# Patient Record
Sex: MALE | Race: WHITE | NOT HISPANIC OR LATINO | Employment: FULL TIME | URBAN - METROPOLITAN AREA
[De-identification: names, ages, dates, MRNs, and addresses within clinical notes are randomized per-mention and may not be internally consistent; named-entity substitution may affect disease eponyms.]

---

## 2023-08-10 DIAGNOSIS — I10 ESSENTIAL (PRIMARY) HYPERTENSION: ICD-10-CM

## 2023-08-10 RX ORDER — LISINOPRIL 20 MG/1
20 TABLET ORAL DAILY
Qty: 30 TABLET | Refills: 5 | Status: SHIPPED | OUTPATIENT
Start: 2023-08-10 | End: 2024-02-19

## 2024-02-17 DIAGNOSIS — I10 ESSENTIAL (PRIMARY) HYPERTENSION: ICD-10-CM

## 2024-02-19 RX ORDER — LISINOPRIL 20 MG/1
20 TABLET ORAL DAILY
Qty: 30 TABLET | Refills: 0 | Status: SHIPPED | OUTPATIENT
Start: 2024-02-19 | End: 2024-03-05 | Stop reason: DRUGHIGH

## 2024-03-05 ENCOUNTER — OFFICE VISIT (OUTPATIENT)
Dept: PRIMARY CARE | Facility: CLINIC | Age: 49
End: 2024-03-05
Payer: COMMERCIAL

## 2024-03-05 VITALS
DIASTOLIC BLOOD PRESSURE: 101 MMHG | SYSTOLIC BLOOD PRESSURE: 151 MMHG | HEIGHT: 75 IN | WEIGHT: 228 LBS | TEMPERATURE: 97.4 F | HEART RATE: 100 BPM | BODY MASS INDEX: 28.35 KG/M2

## 2024-03-05 DIAGNOSIS — Z00.00 HEALTHCARE MAINTENANCE: Primary | ICD-10-CM

## 2024-03-05 DIAGNOSIS — I10 HYPERTENSION, UNSPECIFIED TYPE: ICD-10-CM

## 2024-03-05 PROBLEM — K21.9 GASTROESOPHAGEAL REFLUX DISEASE WITHOUT ESOPHAGITIS: Status: ACTIVE | Noted: 2024-03-05

## 2024-03-05 PROBLEM — R73.03 PREDIABETES: Status: ACTIVE | Noted: 2024-03-05

## 2024-03-05 PROBLEM — E78.5 HYPERLIPIDEMIA: Status: ACTIVE | Noted: 2024-03-05

## 2024-03-05 PROCEDURE — 3080F DIAST BP >= 90 MM HG: CPT | Performed by: FAMILY MEDICINE

## 2024-03-05 PROCEDURE — 3077F SYST BP >= 140 MM HG: CPT | Performed by: FAMILY MEDICINE

## 2024-03-05 PROCEDURE — 1036F TOBACCO NON-USER: CPT | Performed by: FAMILY MEDICINE

## 2024-03-05 PROCEDURE — 99396 PREV VISIT EST AGE 40-64: CPT | Performed by: FAMILY MEDICINE

## 2024-03-05 RX ORDER — PANTOPRAZOLE SODIUM 40 MG/1
40 TABLET, DELAYED RELEASE ORAL
COMMUNITY
Start: 2023-10-14

## 2024-03-05 RX ORDER — LISINOPRIL 30 MG/1
30 TABLET ORAL DAILY
Qty: 30 TABLET | Refills: 3 | Status: SHIPPED | OUTPATIENT
Start: 2024-03-05

## 2024-03-05 ASSESSMENT — PATIENT HEALTH QUESTIONNAIRE - PHQ9
1. LITTLE INTEREST OR PLEASURE IN DOING THINGS: NOT AT ALL
SUM OF ALL RESPONSES TO PHQ9 QUESTIONS 1 AND 2: 0
2. FEELING DOWN, DEPRESSED OR HOPELESS: NOT AT ALL

## 2024-03-05 NOTE — PROGRESS NOTES
"Subjective   Patient ID: Ricardo Nunez is a 48 y.o. male who presents for Annual Exam (He is not fasting for blood work today. ).    HPI   48 year-old male presents for wellness physical.      hx HTN- on lisinopril 20mg; doesn't check BP at home.   Has been under a lot of stress recently at work.     hx HLD- not on any meds;      hx prediabetes HbA1c 5.8 in 2022    BMI 28  no reg exercise but stays active.   trying to eat a healthy diet  last lipids 3/2022 - didn't do labs last yr;      Wears glasses only prn; sees optho  sees dentist     he denies any chest pains, palpitations, edema, shortness of breath, abdominal pains, nausea, vomiting, diarrhea, constipation, blood in stool, melena. no groin or testicle pain or swelling. no urinary changes.     Denies any mole changes.  no hx of skin CA     adacel 2021  flu shot- had  covid shots- had      11/2021 Colonoscopy [facility]- fu 7-10 yrs  hx GERD- on pantoprazole. Takes occ tums;   11/2021 EGD    hx of smoking- smoked 1ppd from 17yo to 42yo; quit using accupuncture;   drinks 6-8 light beers about 5 nights/wk       Review of Systems  ROS as stated in HPI    Objective   BP (!) 151/101   Pulse 100   Temp 36.3 °C (97.4 °F)   Ht 1.892 m (6' 2.5\")   Wt 103 kg (228 lb)   BMI 28.88 kg/m²     Physical Exam  Constitutional: A&O; NAD  HEENT: conjunctiva normal. EOMI; PERRLA; lids normal; TM's clear;   Neck: supple; No lymphadenopathy   Heart: RRR     Lungs: CTA bilateral   Abd: Soft, Nontender, Nondistended  Ext:  No edema;    Neuro: No gross neuro deficits     Psych: Judgment, orientation, mood, affect, insight wnl   Assessment/Plan   Problem List Items Addressed This Visit             ICD-10-CM    HTN (hypertension) I10    Relevant Medications    lisinopril (ZestriL) 30 mg tablet     Other Visit Diagnoses         Codes    Healthcare maintenance    -  Primary Z00.00    Relevant Orders    CBC    Comprehensive Metabolic Panel    Hemoglobin A1C    Lipid Panel    TSH with " reflex to Free T4 if abnormal    Vitamin D 25-Hydroxy,Total (for eval of Vitamin D levels)          11/ 2021 Colonoscopy [facility]- fu 7-10 yrs  adacel 2021  flu shot- had  covid shots- had   healthy lifestyle-diet, exercise  Cut back on beer   check fasting labs  Increase lisinopril from 20mg to 30mg  monitor BP/HR at home and fu in a few mo to recheck  CT calcium score info given- defers at this time

## 2024-03-18 ENCOUNTER — LAB (OUTPATIENT)
Dept: LAB | Facility: LAB | Age: 49
End: 2024-03-18
Payer: COMMERCIAL

## 2024-03-18 DIAGNOSIS — Z00.00 HEALTHCARE MAINTENANCE: ICD-10-CM

## 2024-03-18 LAB
25(OH)D3 SERPL-MCNC: 32 NG/ML (ref 30–100)
ALBUMIN SERPL BCP-MCNC: 4.6 G/DL (ref 3.4–5)
ALP SERPL-CCNC: 41 U/L (ref 33–120)
ALT SERPL W P-5'-P-CCNC: 33 U/L (ref 10–52)
ANION GAP SERPL CALC-SCNC: 18 MMOL/L (ref 10–20)
AST SERPL W P-5'-P-CCNC: 21 U/L (ref 9–39)
BILIRUB SERPL-MCNC: 0.9 MG/DL (ref 0–1.2)
BUN SERPL-MCNC: 13 MG/DL (ref 6–23)
CALCIUM SERPL-MCNC: 9.4 MG/DL (ref 8.6–10.6)
CHLORIDE SERPL-SCNC: 100 MMOL/L (ref 98–107)
CHOLEST SERPL-MCNC: 220 MG/DL (ref 0–199)
CHOLESTEROL/HDL RATIO: 4.3
CO2 SERPL-SCNC: 21 MMOL/L (ref 21–32)
CREAT SERPL-MCNC: 1.08 MG/DL (ref 0.5–1.3)
EGFRCR SERPLBLD CKD-EPI 2021: 85 ML/MIN/1.73M*2
ERYTHROCYTE [DISTWIDTH] IN BLOOD BY AUTOMATED COUNT: 12.3 % (ref 11.5–14.5)
EST. AVERAGE GLUCOSE BLD GHB EST-MCNC: 117 MG/DL
GLUCOSE SERPL-MCNC: 132 MG/DL (ref 74–99)
HBA1C MFR BLD: 5.7 %
HCT VFR BLD AUTO: 41.6 % (ref 41–52)
HDLC SERPL-MCNC: 50.8 MG/DL
HGB BLD-MCNC: 14.1 G/DL (ref 13.5–17.5)
LDLC SERPL CALC-MCNC: ABNORMAL MG/DL
MCH RBC QN AUTO: 30.3 PG (ref 26–34)
MCHC RBC AUTO-ENTMCNC: 33.9 G/DL (ref 32–36)
MCV RBC AUTO: 90 FL (ref 80–100)
NON HDL CHOLESTEROL: 169 MG/DL (ref 0–149)
NRBC BLD-RTO: 0 /100 WBCS (ref 0–0)
PLATELET # BLD AUTO: 261 X10*3/UL (ref 150–450)
POTASSIUM SERPL-SCNC: 4.2 MMOL/L (ref 3.5–5.3)
PROT SERPL-MCNC: 7.2 G/DL (ref 6.4–8.2)
RBC # BLD AUTO: 4.65 X10*6/UL (ref 4.5–5.9)
SODIUM SERPL-SCNC: 135 MMOL/L (ref 136–145)
TRIGL SERPL-MCNC: 463 MG/DL (ref 0–149)
TSH SERPL-ACNC: 1.54 MIU/L (ref 0.44–3.98)
VLDL: ABNORMAL
WBC # BLD AUTO: 6.4 X10*3/UL (ref 4.4–11.3)

## 2024-03-18 PROCEDURE — 80061 LIPID PANEL: CPT

## 2024-03-18 PROCEDURE — 85027 COMPLETE CBC AUTOMATED: CPT

## 2024-03-18 PROCEDURE — 36415 COLL VENOUS BLD VENIPUNCTURE: CPT

## 2024-03-18 PROCEDURE — 83036 HEMOGLOBIN GLYCOSYLATED A1C: CPT

## 2024-03-18 PROCEDURE — 82306 VITAMIN D 25 HYDROXY: CPT

## 2024-03-18 PROCEDURE — 84443 ASSAY THYROID STIM HORMONE: CPT

## 2024-03-18 PROCEDURE — 80053 COMPREHEN METABOLIC PANEL: CPT

## 2024-03-21 ENCOUNTER — TELEPHONE (OUTPATIENT)
Dept: PRIMARY CARE | Facility: CLINIC | Age: 49
End: 2024-03-21
Payer: COMMERCIAL

## 2024-03-21 DIAGNOSIS — E78.5 HYPERLIPIDEMIA, UNSPECIFIED HYPERLIPIDEMIA TYPE: Primary | ICD-10-CM

## 2024-03-21 RX ORDER — ROSUVASTATIN CALCIUM 10 MG/1
10 TABLET, COATED ORAL DAILY
Qty: 30 TABLET | Refills: 3 | Status: SHIPPED | OUTPATIENT
Start: 2024-03-21 | End: 2024-06-11 | Stop reason: ALTCHOICE

## 2024-03-21 NOTE — TELEPHONE ENCOUNTER
----- Message from Ida Brown DO sent at 3/20/2024 10:06 AM EDT -----  Please let pt know that his vitamin D, blood counts, thyroid, liver, and kidney function are all normal.   His sodium was slightly low at 135(should be above 136)  His sugar was elevated at 132 and his HbA1c, the 3 month average of their sugars was 5.7, which is in the pre-diabetic range (anything 6.5 and above is considered diabetic).   His cholesterol was again elevated and his triglycerides were quite high at 463 (should be less than 150) .   I recommend a healthy diet and exercise and see if he is open to trying a cholesterol medication at this poing.

## 2024-06-07 NOTE — PROGRESS NOTES
"Subjective   Patient ID: Ricardo Nunez is a 48 y.o. male who presents for Hypertension (Did not start crestor as he is afraid of the side effects. ).    HPI   48 year-old male presents for fu    Was seen for physical 3/5/24     hx HTN- on lisinopril 30mg (inc from 20mg in march); doesn't check BP at home.   Had been under a lot of stress at work- is getting better    hx HLD/elevated TG- rxd crestor after labs in 3/2024  TG were 463 but hasn't started in fear of potential side effects. Prior TG in the 200s.      hx prediabetes     BMI 28  no reg exercise but stays active. Considering joining [employer]  trying to eat a healthy diet       he denies any chest pains, palpitations, edema, shortness of breath, abdominal pains, nausea, vomiting, diarrhea, constipation, blood in stool, melena. no groin or testicle pain or swelling. no urinary changes.      11/2021 Colonoscopy [facility]- fu 7-10 yrs  hx GERD- on pantoprazole. 11/2021 EGD    hx of smoking- smoked 1ppd from 17yo to 44yo; quit using accupuncture;   Drinks beers about 5 nights/wk trying to cut down- now about 4 beers/night      Review of Systems  ROS as stated in HPI     Objective   /81   Pulse 82   Temp 36.7 °C (98.1 °F)   Ht 1.892 m (6' 2.5\")   Wt 103 kg (226 lb)   BMI 28.63 kg/m²     Physical Exam  Constitutional: A&O; NAD  HEENT: conjunctiva normal. EOMI; PERRLA; lids normal; TM's clear;   Neck: supple; No lymphadenopathy   Heart: RRR     Lungs: CTA bilateral   Abd: Soft, Nontender, Nondistended  Ext:  No edema;    Neuro: No gross neuro deficits     Psych: Judgment, orientation, mood, affect, insight wnl   Assessment/Plan   Problem List Items Addressed This Visit             ICD-10-CM    HTN (hypertension) - Primary I10    Relevant Orders    Comprehensive Metabolic Panel    CT cardiac scoring wo IV contrast    Hyperlipidemia E78.5    Relevant Orders    Comprehensive Metabolic Panel    Lipid Panel    CT cardiac scoring wo IV contrast    " Prediabetes R73.03    Relevant Orders    Comprehensive Metabolic Panel    Hemoglobin A1C    CT cardiac scoring wo IV contrast        11/ 2021 Colonoscopy [facility]- fu 7-10 yrs  adacel 2021  flu shot- had  covid shots- had   healthy lifestyle-diet, exercise  Cut back on beer   recheck fasting labs after 6/18  Check CT calcium score info given  Will reconsider cholesterol meds after repeat labs/CT calcium  cont lisinopril  30mg  monitor BP/HR at home

## 2024-06-11 ENCOUNTER — OFFICE VISIT (OUTPATIENT)
Dept: PRIMARY CARE | Facility: CLINIC | Age: 49
End: 2024-06-11
Payer: COMMERCIAL

## 2024-06-11 VITALS
DIASTOLIC BLOOD PRESSURE: 81 MMHG | BODY MASS INDEX: 28.1 KG/M2 | TEMPERATURE: 98.1 F | HEART RATE: 82 BPM | WEIGHT: 226 LBS | SYSTOLIC BLOOD PRESSURE: 125 MMHG | HEIGHT: 75 IN

## 2024-06-11 DIAGNOSIS — E78.5 HYPERLIPIDEMIA, UNSPECIFIED HYPERLIPIDEMIA TYPE: ICD-10-CM

## 2024-06-11 DIAGNOSIS — I10 HYPERTENSION, UNSPECIFIED TYPE: Primary | ICD-10-CM

## 2024-06-11 DIAGNOSIS — R73.03 PREDIABETES: ICD-10-CM

## 2024-06-11 PROCEDURE — 99214 OFFICE O/P EST MOD 30 MIN: CPT | Performed by: FAMILY MEDICINE

## 2024-06-11 PROCEDURE — 1036F TOBACCO NON-USER: CPT | Performed by: FAMILY MEDICINE

## 2024-06-11 PROCEDURE — 3079F DIAST BP 80-89 MM HG: CPT | Performed by: FAMILY MEDICINE

## 2024-06-11 PROCEDURE — 3074F SYST BP LT 130 MM HG: CPT | Performed by: FAMILY MEDICINE

## 2024-06-29 DIAGNOSIS — I10 HYPERTENSION, UNSPECIFIED TYPE: ICD-10-CM

## 2024-07-01 RX ORDER — LISINOPRIL 30 MG/1
30 TABLET ORAL DAILY
Qty: 30 TABLET | Refills: 3 | Status: SHIPPED | OUTPATIENT
Start: 2024-07-01

## 2024-08-14 ENCOUNTER — APPOINTMENT (OUTPATIENT)
Dept: RADIOLOGY | Facility: CLINIC | Age: 49
End: 2024-08-14
Payer: COMMERCIAL

## 2024-09-17 ENCOUNTER — HOSPITAL ENCOUNTER (OUTPATIENT)
Dept: RADIOLOGY | Facility: CLINIC | Age: 49
Discharge: HOME | End: 2024-09-17
Payer: COMMERCIAL

## 2024-09-17 ENCOUNTER — TELEPHONE (OUTPATIENT)
Dept: PRIMARY CARE | Facility: CLINIC | Age: 49
End: 2024-09-17
Payer: COMMERCIAL

## 2024-09-17 DIAGNOSIS — E78.5 HYPERLIPIDEMIA, UNSPECIFIED HYPERLIPIDEMIA TYPE: ICD-10-CM

## 2024-09-17 DIAGNOSIS — R93.1 ELEVATED CORONARY ARTERY CALCIUM SCORE: ICD-10-CM

## 2024-09-17 DIAGNOSIS — I10 HYPERTENSION, UNSPECIFIED TYPE: ICD-10-CM

## 2024-09-17 DIAGNOSIS — R73.03 PREDIABETES: ICD-10-CM

## 2024-09-17 PROCEDURE — 75571 CT HRT W/O DYE W/CA TEST: CPT

## 2024-09-17 NOTE — TELEPHONE ENCOUNTER
----- Message from Ida Kevin sent at 9/17/2024  8:59 AM EDT -----  Please let patient know his CT calcium score was quite elevated at 689 which indicates a moderate cardiac risk.  I recommend he recheck his labs as ordered and reconsider a cholesterol medication.  I would also like him to follow-up with a cardiologist for further evaluation.  Enter referral for cardio dx elevated coronary calcium score

## 2024-09-24 ENCOUNTER — LAB (OUTPATIENT)
Dept: LAB | Facility: LAB | Age: 49
End: 2024-09-24
Payer: COMMERCIAL

## 2024-09-24 DIAGNOSIS — I10 HYPERTENSION, UNSPECIFIED TYPE: ICD-10-CM

## 2024-09-24 DIAGNOSIS — R73.03 PREDIABETES: ICD-10-CM

## 2024-09-24 DIAGNOSIS — E78.5 HYPERLIPIDEMIA, UNSPECIFIED HYPERLIPIDEMIA TYPE: ICD-10-CM

## 2024-09-24 LAB
ALBUMIN SERPL BCP-MCNC: 4.5 G/DL (ref 3.4–5)
ALP SERPL-CCNC: 50 U/L (ref 33–120)
ALT SERPL W P-5'-P-CCNC: 34 U/L (ref 10–52)
ANION GAP SERPL CALC-SCNC: 15 MMOL/L (ref 10–20)
AST SERPL W P-5'-P-CCNC: 24 U/L (ref 9–39)
BILIRUB SERPL-MCNC: 0.6 MG/DL (ref 0–1.2)
BUN SERPL-MCNC: 16 MG/DL (ref 6–23)
CALCIUM SERPL-MCNC: 9.7 MG/DL (ref 8.6–10.6)
CHLORIDE SERPL-SCNC: 101 MMOL/L (ref 98–107)
CHOLEST SERPL-MCNC: 180 MG/DL (ref 0–199)
CHOLESTEROL/HDL RATIO: 3.2
CO2 SERPL-SCNC: 25 MMOL/L (ref 21–32)
CREAT SERPL-MCNC: 1.18 MG/DL (ref 0.5–1.3)
EGFRCR SERPLBLD CKD-EPI 2021: 76 ML/MIN/1.73M*2
EST. AVERAGE GLUCOSE BLD GHB EST-MCNC: 111 MG/DL
GLUCOSE SERPL-MCNC: 125 MG/DL (ref 74–99)
HBA1C MFR BLD: 5.5 %
HDLC SERPL-MCNC: 56.8 MG/DL
LDLC SERPL CALC-MCNC: 69 MG/DL
NON HDL CHOLESTEROL: 123 MG/DL (ref 0–149)
POTASSIUM SERPL-SCNC: 4.6 MMOL/L (ref 3.5–5.3)
PROT SERPL-MCNC: 7 G/DL (ref 6.4–8.2)
SODIUM SERPL-SCNC: 136 MMOL/L (ref 136–145)
TRIGL SERPL-MCNC: 273 MG/DL (ref 0–149)
VLDL: 55 MG/DL (ref 0–40)

## 2024-09-24 PROCEDURE — 36415 COLL VENOUS BLD VENIPUNCTURE: CPT

## 2024-09-24 PROCEDURE — 80053 COMPREHEN METABOLIC PANEL: CPT

## 2024-09-24 PROCEDURE — 80061 LIPID PANEL: CPT

## 2024-09-24 PROCEDURE — 83036 HEMOGLOBIN GLYCOSYLATED A1C: CPT

## 2024-09-25 ENCOUNTER — TELEPHONE (OUTPATIENT)
Dept: PRIMARY CARE | Facility: CLINIC | Age: 49
End: 2024-09-25
Payer: COMMERCIAL

## 2024-09-25 DIAGNOSIS — R73.03 PREDIABETES: ICD-10-CM

## 2024-09-25 DIAGNOSIS — E78.5 HYPERLIPIDEMIA, UNSPECIFIED HYPERLIPIDEMIA TYPE: ICD-10-CM

## 2024-09-25 DIAGNOSIS — I10 HYPERTENSION, UNSPECIFIED TYPE: ICD-10-CM

## 2024-09-25 RX ORDER — ROSUVASTATIN CALCIUM 5 MG/1
5 TABLET, COATED ORAL DAILY
Qty: 30 TABLET | Refills: 3 | Status: SHIPPED | OUTPATIENT
Start: 2024-09-25 | End: 2025-01-23

## 2024-09-25 NOTE — TELEPHONE ENCOUNTER
----- Message from Ida Brown sent at 9/25/2024 11:07 AM EDT -----  Please let pt know that his electrolytes, liver, and kidney function are all normal.   His triglycerides were better but still quite elevated at 275.  Should be less than 150. I recommend a healthy diet and exercise and see if he is open to trying a cholesterol medication at this point.  His sugar was elevated at 125 and his HbA1c, the 3 month average of sugars was 5.5 (anything 6.5 and above is considered diabetic).  I recommend a healthy diet and exercise and we will continue to monitor.

## 2024-10-01 ENCOUNTER — OFFICE VISIT (OUTPATIENT)
Dept: CARDIOLOGY | Facility: CLINIC | Age: 49
End: 2024-10-01
Payer: COMMERCIAL

## 2024-10-01 VITALS
SYSTOLIC BLOOD PRESSURE: 140 MMHG | BODY MASS INDEX: 27.54 KG/M2 | DIASTOLIC BLOOD PRESSURE: 94 MMHG | HEIGHT: 76 IN | OXYGEN SATURATION: 97 % | HEART RATE: 83 BPM | WEIGHT: 226.2 LBS

## 2024-10-01 DIAGNOSIS — I49.3 PVC (PREMATURE VENTRICULAR CONTRACTION): ICD-10-CM

## 2024-10-01 DIAGNOSIS — I10 HYPERTENSION, UNSPECIFIED TYPE: ICD-10-CM

## 2024-10-01 DIAGNOSIS — R93.1 ELEVATED CORONARY ARTERY CALCIUM SCORE: Primary | ICD-10-CM

## 2024-10-01 DIAGNOSIS — R06.02 SHORTNESS OF BREATH: ICD-10-CM

## 2024-10-01 DIAGNOSIS — E78.5 HYPERLIPIDEMIA, UNSPECIFIED HYPERLIPIDEMIA TYPE: ICD-10-CM

## 2024-10-01 PROCEDURE — 3077F SYST BP >= 140 MM HG: CPT | Performed by: STUDENT IN AN ORGANIZED HEALTH CARE EDUCATION/TRAINING PROGRAM

## 2024-10-01 PROCEDURE — 99204 OFFICE O/P NEW MOD 45 MIN: CPT | Performed by: STUDENT IN AN ORGANIZED HEALTH CARE EDUCATION/TRAINING PROGRAM

## 2024-10-01 PROCEDURE — 3080F DIAST BP >= 90 MM HG: CPT | Performed by: STUDENT IN AN ORGANIZED HEALTH CARE EDUCATION/TRAINING PROGRAM

## 2024-10-01 PROCEDURE — 3008F BODY MASS INDEX DOCD: CPT | Performed by: STUDENT IN AN ORGANIZED HEALTH CARE EDUCATION/TRAINING PROGRAM

## 2024-10-01 PROCEDURE — 93005 ELECTROCARDIOGRAM TRACING: CPT | Performed by: STUDENT IN AN ORGANIZED HEALTH CARE EDUCATION/TRAINING PROGRAM

## 2024-10-01 PROCEDURE — 99214 OFFICE O/P EST MOD 30 MIN: CPT | Performed by: STUDENT IN AN ORGANIZED HEALTH CARE EDUCATION/TRAINING PROGRAM

## 2024-10-01 PROCEDURE — 93010 ELECTROCARDIOGRAM REPORT: CPT | Performed by: STUDENT IN AN ORGANIZED HEALTH CARE EDUCATION/TRAINING PROGRAM

## 2024-10-01 RX ORDER — LISINOPRIL 40 MG/1
40 TABLET ORAL DAILY
Qty: 30 TABLET | Refills: 11 | Status: SHIPPED | OUTPATIENT
Start: 2024-10-01 | End: 2025-10-01

## 2024-10-01 NOTE — PROGRESS NOTES
Referred by Dr. Brown for Hyperlipidemia and Hypertension (Referred by Dr Brown for cardiac consult due to elevated calcium score)     History Of Present Illness:    Ricardo Nunez is a 48 y.o. male past with history notable for hypertriglyceridemia and hypertension referred to our clinic due to elevated calcium score.  Patient recently underwent calcium score that came back at 689.  At baseline he denies angina symptoms.  He does have some shortness of breath with heavy exertion.  No lightheadedness dizziness or syncopal events.  No orthopnea PND or lower extreme edema.  No family history of elevated cardiovascular events.  He is a former smoker but quit 5 to 10 years ago.  He does drink up to 6 beers of alcohol per day.  His most recent cholesterol reviewed showing acceptable LDL and HDL numbers triglycerides are elevated likely in setting of alcohol consumption.  Most recent A1c is 5.5.  Baseline ECG shows sinus rhythm with PVCs.    Past Medical History:  He has no past medical history on file.    Past Surgical History:  He has a past surgical history that includes Other surgical history (09/19/2019).      Social History:  He reports that he quit smoking about 7 years ago. His smoking use included cigarettes. He has never used smokeless tobacco. He reports current alcohol use. He reports that he does not use drugs.    Family History:  No family history on file.     Allergies:  Patient has no known allergies.    Outpatient Medications:  Current Outpatient Medications   Medication Instructions    lisinopril 40 mg, oral, Daily    multivitamin with minerals (multivitamin-iron-folic acid) tablet 1 tablet, oral, Daily    pantoprazole (PROTONIX) 40 mg, oral, Daily before breakfast    rosuvastatin (CRESTOR) 5 mg, oral, Daily        Last Recorded Vitals:  Vitals:    10/01/24 1058   BP: (!) 140/94   BP Location: Left arm   Patient Position: Sitting   BP Cuff Size: Adult   Pulse: 83   SpO2: 97%   Weight: 103 kg (226 lb 3.2  "oz)   Height: 1.93 m (6' 4\")       Physical Exam:  General: No acute distress,  A&O x3  Skin: Warm and dry  Neck: JVD is not elevated  ENT: Moist mucous membranes no lesions appreciated  Pulmonary: CTAB  Cards: Regular rate rhythm, no murmurs gallops or rubs appreciated normal S1-S2  Abdomen: Soft nontender nondistended  Extremities: No edema or cyanosis  Psych: Appropriate mood and affect          Last Labs:  CBC -  Lab Results   Component Value Date    WBC 6.4 03/18/2024    HGB 14.1 03/18/2024    HCT 41.6 03/18/2024    MCV 90 03/18/2024     03/18/2024       CMP -  Lab Results   Component Value Date    CALCIUM 9.7 09/24/2024    PROT 7.0 09/24/2024    ALBUMIN 4.5 09/24/2024    AST 24 09/24/2024    ALT 34 09/24/2024    ALKPHOS 50 09/24/2024    BILITOT 0.6 09/24/2024       LIPID PANEL -   Lab Results   Component Value Date    CHOL 180 09/24/2024    TRIG 273 (H) 09/24/2024    HDL 56.8 09/24/2024    CHHDL 3.2 09/24/2024    LDLF 123 (H) 03/29/2022    VLDL 55 (H) 09/24/2024    NHDL 123 09/24/2024       RENAL FUNCTION PANEL -   Lab Results   Component Value Date    GLUCOSE 125 (H) 09/24/2024     09/24/2024    K 4.6 09/24/2024     09/24/2024    CO2 25 09/24/2024    ANIONGAP 15 09/24/2024    BUN 16 09/24/2024    CREATININE 1.18 09/24/2024    GFRMALE 85 03/29/2022    CALCIUM 9.7 09/24/2024    ALBUMIN 4.5 09/24/2024        Lab Results   Component Value Date    HGBA1C 5.5 09/24/2024         Assessment/Plan     1.  CAD: Based on calcium score 689.  No angina symptoms mild shortness of breath with exertion.  Baseline ECG of sinus rhythm with PVCs.  Risk factors include hypertension hypertriglyceridemia as well as excessive alcohol consumption.  He is on rosuvastatin for risk modification.  Most recent LDL is less than 70.    -Ordered stress echocardiogram for further risk stratification    2.  Hypertension: Blood pressures are not at goal.  Increase lisinopril to 40 mg daily.  Patient will monitor blood " pressure daily for the next 2 weeks and call us with results.    3.  Hypertriglyceridemia: He is on rosuvastatin 5 mg daily.  LDL is at goal.  Suspect triglycerides elevated in setting of excessive alcohol consumption.  Discussed the need to minimize alcohol usage.    4.  Alcohol consumption: Drinks about 6 light beers daily.  Advised patient to cut that in half or lower.    6-month follow-up if testing is unremarkable    (This note was generated with voice recognition software and may contain errors including spelling, grammar, syntax and missed recognition of what was dictated, of which may not have been fully corrected)       Tapan Sheppard MD PhD

## 2024-10-01 NOTE — LETTER
October 1, 2024     Ida Brown DO  [address]  [address]  [facility] [id number]    Patient: Ricardo Nunez   YOB: 1975   Date of Visit: 10/1/2024       Dear Dr. Ida Brown, :    Thank you for referring Ricardo Nunez to me for evaluation. Below are the relevant portions of my assessment and plan of care.    Assessment / Plan:  1.  CAD: Based on calcium score 689.  No angina symptoms mild shortness of breath with exertion.  Baseline ECG of sinus rhythm with PVCs.  Risk factors include hypertension hypertriglyceridemia as well as excessive alcohol consumption.  He is on rosuvastatin for risk modification.  Most recent LDL is less than 70.    -Ordered stress echocardiogram for further risk stratification    2.  Hypertension: Blood pressures are not at goal.  Increase lisinopril to 40 mg daily.  Patient will monitor blood pressure daily for the next 2 weeks and call us with results.    3.  Hypertriglyceridemia: He is on rosuvastatin 5 mg daily.  LDL is at goal.  Suspect triglycerides elevated in setting of excessive alcohol consumption.  Discussed the need to minimize alcohol usage.    4.  Alcohol consumption: Drinks about 6 light beers daily.  Advised patient to cut that in half or lower.    6-month follow-up if testing is unremarkable    (This note was generated with voice recognition software and may contain errors including spelling, grammar, syntax and missed recognition of what was dictated, of which may not have been fully corrected)       Tapan Sheppard MD PhD    If you have questions, please do not hesitate to call me. I look forward to following Ricardo along with you.         Sincerely,        Tapan Sheppard MD PhD        CC: No Recipients

## 2024-10-15 ENCOUNTER — HOSPITAL ENCOUNTER (OUTPATIENT)
Dept: CARDIOLOGY | Facility: CLINIC | Age: 49
Discharge: HOME | End: 2024-10-15
Payer: COMMERCIAL

## 2024-10-15 DIAGNOSIS — I10 HYPERTENSION, UNSPECIFIED TYPE: ICD-10-CM

## 2024-10-15 DIAGNOSIS — R93.1 ELEVATED CORONARY ARTERY CALCIUM SCORE: ICD-10-CM

## 2024-10-15 DIAGNOSIS — E78.5 HYPERLIPIDEMIA, UNSPECIFIED HYPERLIPIDEMIA TYPE: ICD-10-CM

## 2024-10-15 DIAGNOSIS — R06.02 SHORTNESS OF BREATH: ICD-10-CM

## 2024-10-15 DIAGNOSIS — I49.3 PVC (PREMATURE VENTRICULAR CONTRACTION): ICD-10-CM

## 2024-10-15 PROCEDURE — 93017 CV STRESS TEST TRACING ONLY: CPT

## 2025-02-02 DIAGNOSIS — E78.5 HYPERLIPIDEMIA, UNSPECIFIED HYPERLIPIDEMIA TYPE: ICD-10-CM

## 2025-02-04 RX ORDER — ROSUVASTATIN CALCIUM 5 MG/1
5 TABLET, COATED ORAL DAILY
Qty: 30 TABLET | Refills: 3 | Status: SHIPPED | OUTPATIENT
Start: 2025-02-04

## 2025-05-28 NOTE — TELEPHONE ENCOUNTER
Informed patient.  He would like to try a cholesterol medication at this point.  He has a follow up in June already scheduled.    X2 attempt for lab and line. Unable to obtain at this time.

## 2025-06-07 DIAGNOSIS — E78.5 HYPERLIPIDEMIA, UNSPECIFIED HYPERLIPIDEMIA TYPE: ICD-10-CM

## 2025-06-10 RX ORDER — ROSUVASTATIN CALCIUM 5 MG/1
5 TABLET, COATED ORAL DAILY
Qty: 30 TABLET | Refills: 3 | Status: SHIPPED | OUTPATIENT
Start: 2025-06-10

## 2025-08-02 LAB
ALBUMIN SERPL-MCNC: 4.6 G/DL (ref 3.6–5.1)
ALP SERPL-CCNC: 54 U/L (ref 36–130)
ALT SERPL-CCNC: 32 U/L (ref 9–46)
ANION GAP SERPL CALCULATED.4IONS-SCNC: 10 MMOL/L (CALC) (ref 7–17)
AST SERPL-CCNC: 19 U/L (ref 10–40)
BILIRUB SERPL-MCNC: 0.6 MG/DL (ref 0.2–1.2)
BUN SERPL-MCNC: 23 MG/DL (ref 7–25)
CALCIUM SERPL-MCNC: 9.7 MG/DL (ref 8.6–10.3)
CHLORIDE SERPL-SCNC: 104 MMOL/L (ref 98–110)
CHOLEST SERPL-MCNC: 126 MG/DL
CHOLEST/HDLC SERPL: 2.7 (CALC)
CO2 SERPL-SCNC: 24 MMOL/L (ref 20–32)
CREAT SERPL-MCNC: 1.16 MG/DL (ref 0.6–1.29)
EGFRCR SERPLBLD CKD-EPI 2021: 77 ML/MIN/1.73M2
EST. AVERAGE GLUCOSE BLD GHB EST-MCNC: 126 MG/DL
EST. AVERAGE GLUCOSE BLD GHB EST-SCNC: 7 MMOL/L
GLUCOSE SERPL-MCNC: 123 MG/DL (ref 65–99)
HBA1C MFR BLD: 6 %
HDLC SERPL-MCNC: 46 MG/DL
LDLC SERPL CALC-MCNC: 63 MG/DL (CALC)
NONHDLC SERPL-MCNC: 80 MG/DL (CALC)
POTASSIUM SERPL-SCNC: 4.4 MMOL/L (ref 3.5–5.3)
PROT SERPL-MCNC: 7.2 G/DL (ref 6.1–8.1)
SODIUM SERPL-SCNC: 138 MMOL/L (ref 135–146)
TRIGL SERPL-MCNC: 88 MG/DL

## 2025-08-04 ASSESSMENT — PROMIS GLOBAL HEALTH SCALE
CARRYOUT_PHYSICAL_ACTIVITIES: COMPLETELY
RATE_SOCIAL_SATISFACTION: GOOD
RATE_AVERAGE_FATIGUE: MILD
CARRYOUT_SOCIAL_ACTIVITIES: GOOD
RATE_AVERAGE_PAIN: 2
RATE_QUALITY_OF_LIFE: GOOD
RATE_MENTAL_HEALTH: GOOD
RATE_PHYSICAL_HEALTH: GOOD
EMOTIONAL_PROBLEMS: RARELY
RATE_GENERAL_HEALTH: GOOD

## 2025-08-06 NOTE — PROGRESS NOTES
"Subjective   Patient ID: Ricardo Nunez is a 49 y.o. male who presents for Annual Exam (Blood work done. ).    HPI     49 year-old male presents for phyiscal    recent labs:   hba1c 6 glucose 123   rest of cmp and lipids were ok    9/2024 CT calcium score was 689  -saw cardio in oct 2024- inc lisinopril to 40 and started on crestor    hx HTN- on lisinopril 40mg  doesn't check BP at home.   occ feels lightheaded if gets up quickly.      hx HLD/elevated TG/ prediabetes     BMI 27  no reg exercise but stays active. Considering joining [employer]  trying to eat a healthy diet       he denies any chest pains, palpitations, edema, shortness of breath, abdominal pains, nausea, vomiting, diarrhea, constipation, blood in stool, melena. no groin or testicle pain or swelling. no urinary changes.      11/2021 Colonoscopy [facility]- fu 7-10 yrs  hx GERD/eosinophillic esophagitis;  sees GI dr vargas- just saw him 7/28/25. - on pantoprazole BID  11/2021 EGD    hx of smoking- smoked 1ppd from 17yo to 42yo; quit using accupuncture;   Drinks beers about 5 nights/wk trying to cut down- now about 4 beers/night?    has glasses sees optho  sees dentist   no mole changes  plans to see derm    stopped drinking jan 2025 and started going to the gym 4days/wk- wt training    Review of Systems  ROS as stated in HPI    Objective   /70   Pulse 95   Temp 36.9 °C (98.5 °F)   Ht 1.93 m (6' 4\")   Wt 104 kg (228 lb 12.8 oz)   BMI 27.85 kg/m²     Physical Exam  Constitutional: A&O; NAD  HEENT: conjunctiva normal. EOMI; PERRLA; lids normal; TM's clear;   Neck: supple; No lymphadenopathy   Heart: RRR     Lungs: CTA bilateral   Abd: Soft, Nontender, Nondistended  Ext:  No edema;    Neuro: No gross neuro deficits     Psych: Judgment, orientation, mood, affect, insight wnl     Assessment/Plan   Problem List Items Addressed This Visit    None  Visit Diagnoses         Codes      Healthcare maintenance    -  Primary Z00.00    Relevant Orders    " CBC    Comprehensive Metabolic Panel    Hemoglobin A1C    Lipid Panel      Skin cancer screening     Z12.83    Relevant Orders    Referral to Dermatology               reviewed recent labs  recheck labs in 6 mo and fu after  monitor BP inc water intake   11/ 2021 Colonoscopy [facility]- fu 7-10 yrs  adacel 2021  flu shot- in fall   covid shots- had   healthy lifestyle-diet, exercise  9/2024 CT calcium score was 689  - fu with cardio  10/2024 stress echo - freq PVCs  fu with derm for skin check

## 2025-08-07 ENCOUNTER — APPOINTMENT (OUTPATIENT)
Dept: PRIMARY CARE | Facility: CLINIC | Age: 50
End: 2025-08-07
Payer: COMMERCIAL

## 2025-08-07 VITALS
HEART RATE: 95 BPM | WEIGHT: 228.8 LBS | TEMPERATURE: 98.5 F | BODY MASS INDEX: 27.86 KG/M2 | HEIGHT: 76 IN | SYSTOLIC BLOOD PRESSURE: 109 MMHG | DIASTOLIC BLOOD PRESSURE: 70 MMHG

## 2025-08-07 DIAGNOSIS — Z12.83 SKIN CANCER SCREENING: ICD-10-CM

## 2025-08-07 DIAGNOSIS — Z00.00 HEALTHCARE MAINTENANCE: Primary | ICD-10-CM

## 2025-08-07 PROCEDURE — 3008F BODY MASS INDEX DOCD: CPT | Performed by: FAMILY MEDICINE

## 2025-08-07 PROCEDURE — 99396 PREV VISIT EST AGE 40-64: CPT | Performed by: FAMILY MEDICINE

## 2025-08-07 PROCEDURE — 3078F DIAST BP <80 MM HG: CPT | Performed by: FAMILY MEDICINE

## 2025-08-07 PROCEDURE — 3074F SYST BP LT 130 MM HG: CPT | Performed by: FAMILY MEDICINE

## 2025-08-07 ASSESSMENT — PATIENT HEALTH QUESTIONNAIRE - PHQ9
SUM OF ALL RESPONSES TO PHQ9 QUESTIONS 1 AND 2: 0
1. LITTLE INTEREST OR PLEASURE IN DOING THINGS: NOT AT ALL
2. FEELING DOWN, DEPRESSED OR HOPELESS: NOT AT ALL

## 2026-08-13 ENCOUNTER — APPOINTMENT (OUTPATIENT)
Dept: PRIMARY CARE | Facility: CLINIC | Age: 51
End: 2026-08-13
Payer: COMMERCIAL